# Patient Record
Sex: FEMALE | URBAN - METROPOLITAN AREA
[De-identification: names, ages, dates, MRNs, and addresses within clinical notes are randomized per-mention and may not be internally consistent; named-entity substitution may affect disease eponyms.]

---

## 2017-01-02 ENCOUNTER — OFFICE VISIT (OUTPATIENT)
Dept: OPHTHALMOLOGY | Facility: CLINIC | Age: 72
End: 2017-01-02

## 2017-01-02 DIAGNOSIS — H18.529 ABMD (ANTERIOR BASEMENT MEMBRANE DYSTROPHY): Primary | ICD-10-CM

## 2017-01-02 DIAGNOSIS — H35.369 DRUSEN (DEGENERATIVE) OF RETINA, UNSPECIFIED LATERALITY: ICD-10-CM

## 2017-01-02 DIAGNOSIS — H25.10 SENILE NUCLEAR SCLEROSIS, UNSPECIFIED LATERALITY: ICD-10-CM

## 2017-01-02 RX ORDER — ANTIOX #8/OM3/DHA/EPA/LUT/ZEAX 250-2.5 MG
1 CAPSULE ORAL 2 TIMES DAILY
COMMUNITY

## 2017-01-02 ASSESSMENT — REFRACTION_MANIFEST
OS_SPHERE: -0.75
OS_ADD: +2.50
OS_CYLINDER: +0.50
OD_ADD: +2.50
OD_CYLINDER: +1.00
OS_AXIS: 135
OD_SPHERE: -0.75
OD_AXIS: 60

## 2017-01-02 ASSESSMENT — SLIT LAMP EXAM - LIDS
COMMENTS: NORMAL
COMMENTS: NORMAL

## 2017-01-02 ASSESSMENT — CONF VISUAL FIELD
OD_NORMAL: 1
OS_NORMAL: 1

## 2017-01-02 ASSESSMENT — VISUAL ACUITY
OD_CC: 20/30
CORRECTION_TYPE: GLASSES
OS_CC: 20/30
OD_CC: J1+
OS_CC: J1+
METHOD: SNELLEN - LINEAR

## 2017-01-02 ASSESSMENT — REFRACTION_WEARINGRX
OD_SPHERE: -0.75
OD_AXIS: 57
OS_CYLINDER: +0.50
OD_ADD: +2.50
OS_SPHERE: -1.25
SPECS_TYPE: PAL
OS_ADD: +2.50
OD_CYLINDER: +1.50
OS_AXIS: 127

## 2017-01-02 ASSESSMENT — EXTERNAL EXAM - RIGHT EYE: OD_EXAM: NORMAL

## 2017-01-02 ASSESSMENT — CUP TO DISC RATIO
OD_RATIO: 0.3
OS_RATIO: 0.25

## 2017-01-02 ASSESSMENT — TONOMETRY
OD_IOP_MMHG: 20
OS_IOP_MMHG: 19
IOP_METHOD: ICARE

## 2017-01-02 ASSESSMENT — REFRACTION
OD_AXIS: 060
OD_SPHERE: -0.50
OD_CYLINDER: +0.50

## 2017-01-02 ASSESSMENT — EXTERNAL EXAM - LEFT EYE: OS_EXAM: NORMAL

## 2017-01-02 NOTE — MR AVS SNAPSHOT
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      Who to contact     Please call your clinic at [phone] to:    Ask questions about your health    Make or cancel appointments    Discuss your medicines    Learn about your test results    Speak to your doctor   If you have compliments or concerns about an experience at your clinic, or if you wish to file a complaint, please contact [facility] Patient Relations at [phone] or email us at [email or web address]         Additional Information About Your Visit        MyChart Information     [employer] gives you secure access to your electronic health record. If you see a primary care provider, you can also send messages to your care team and make appointments. If you have questions, please call your primary care clinic.  If you do not have a primary care provider, please call [phone] and they will assist you.      Mailpile is an electronic gateway that provides easy, online access to your medical records. With Mailpile, you can request a clinic appointment, read your test results, renew a prescription or communicate with your care team.     To access your existing account, please contact your [facility] or call [phone] for assistance.         Blood Pressure from Last 3 Encounters:    No data found for BP    Weight from Last 3 Encounters:   No data found for Wt              Today, you had the following     No orders found for display       Primary Care Provider Office Phone # Fax #    Navi Mei [phone]       [facility] [address] [place] [address]        Thank you!     Thank you for choosing [facility]  for your care. Our goal is always to provide you with excellent care. Hearing back from our patients is one way we can continue to improve our services. Please take a few minutes to complete the written survey that you may receive in the mail after your visit with us. Thank you!             Your Updated Medication List - Protect others around you: Learn how to safely use, store and throw away your medicines at www.disposemymeds.org.          This list is accurate as of: 1/2/17  3:15 PM.  Always use your most recent med list.                   Brand Name Dispense Instructions for use    calcium citrate-vitamin D 315-200 MG-UNIT Tabs per tablet    CITRACAL     Take 1 tablet by mouth daily       CITALOPRAM HYDROBROMIDE PO          PRESERVISION AREDS 2 Caps      Take 1 capsule by mouth 2 times daily       VITAMIN D3 PO      Take by mouth daily

## 2017-01-02 NOTE — PROGRESS NOTES
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 or sooner as needed.    I have confirmed the content of the chief complaint, history of present illness, review of systems, and past medical/surgical history sections and edited the information as needed.

## 2017-01-02 NOTE — NURSING NOTE
Chief Complaints and History of Present Illnesses   Patient presents with     Eye Problem     Blurry RE     HPI    Symptoms:     Blurred vision      Duration:  4 months         Comments:  Contrasting especially at night.  June ARGUETA 2:33 PM January 2, 2017

## 2017-04-10 ENCOUNTER — OFFICE VISIT (OUTPATIENT)
Dept: OPHTHALMOLOGY | Facility: CLINIC | Age: 72
End: 2017-04-10

## 2017-04-10 DIAGNOSIS — H18.529 ABMD (ANTERIOR BASEMENT MEMBRANE DYSTROPHY): Primary | ICD-10-CM

## 2017-04-10 ASSESSMENT — VISUAL ACUITY
CORRECTION_TYPE: GLASSES
METHOD: SNELLEN - LINEAR
OS_CC: 20/25
OD_CC+: -1
OD_CC: 20/20
OS_CC+: -1

## 2017-04-10 ASSESSMENT — EXTERNAL EXAM - RIGHT EYE: OD_EXAM: NORMAL

## 2017-04-10 ASSESSMENT — REFRACTION_WEARINGRX
OD_SPHERE: +0.25
OS_CYLINDER: +0.75
OD_ADD: +2.50
OS_AXIS: 130
OD_CYLINDER: +0.50
OS_ADD: +2.50
OS_SPHERE: -0.75
OD_AXIS: 052

## 2017-04-10 ASSESSMENT — SLIT LAMP EXAM - LIDS
COMMENTS: NORMAL
COMMENTS: NORMAL

## 2017-04-10 ASSESSMENT — TONOMETRY
IOP_METHOD: ICARE
OD_IOP_MMHG: 19
OS_IOP_MMHG: 20

## 2017-04-10 ASSESSMENT — EXTERNAL EXAM - LEFT EYE: OS_EXAM: NORMAL

## 2017-04-10 ASSESSMENT — CONF VISUAL FIELD
OD_NORMAL: 1
OS_NORMAL: 1

## 2017-04-10 NOTE — PROGRESS NOTES
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 or sooner as needed.    Complete documentation of historical and exam elements from today's encounter can  be found in the full encounter summary report (not reduplicated in this progress  note). I personally obtained the chief complaint(s) and history of present illness. I  confirmed and edited as necessary the review of systems, past medical/surgical  history, family history, social history, and examination findings as documented by  others; and I examined the patient myself. I personally reviewed the relevant tests,  images, and reports as documented above. I formulated and edited as necessary the  assessment and plan and discussed the findings and management plan with the  patient and family.    NIC Greene M.D

## 2017-04-10 NOTE — NURSING NOTE
Chief Complaints and History of Present Illnesses   Patient presents with     Follow Up For     ABMD      HPI    Last Eye Exam:  1/2/17   Affected eye(s):  Both   Symptoms:     No tearing   No Dryness   No itching   No burning   No photophobia      Duration:  4 months   Frequency:  Constant       Do you have eye pain now?:  No      Comments:  Pt returns for 4 month follow up on Diplopia. States that the only time she is noticing any Diplopia is only while driving, will see 2 green lights or 2 red lights. Using Blipify 128 qod with no problems. Denies any pain or discomfort today.   Notes that she is taking the AREDS bid po qd. SHILPA ESCOBAR, COA 9:36 AM 04/10/2017

## 2017-04-10 NOTE — MR AVS SNAPSHOT
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      Who to contact     Please call your clinic at [phone] to:    Ask questions about your health    Make or cancel appointments    Discuss your medicines    Learn about your test results    Speak to your doctor   If you have compliments or concerns about an experience at your clinic, or if you wish to file a complaint, please contact [facility] Patient Relations at [phone] or email us at [email or web address]         Additional Information About Your Visit        MyChart Information     [employer] gives you secure access to your electronic health record. If you see a primary care provider, you can also send messages to your care team and make appointments. If you have questions, please call your primary care clinic.  If you do not have a primary care provider, please call [phone] and they will assist you.      YPlan is an electronic gateway that provides easy, online access to your medical records. With YPlan, you can request a clinic appointment, read your test results, renew a prescription or communicate with your care team.     To access your existing account, please contact your [facility] or call [phone] for assistance.        Care EveryWhere ID     This is your Care EveryWhere ID. This could be used by other organizations to access your [facility]  records  [id number]         Blood Pressure from Last 3 Encounters:   No data found for BP    Weight from Last 3 Encounters:   No data found for Wt              Today, you had the following     No orders found for display       Primary Care Provider Office Phone # Fax #    Navi Mei [phone]       [facility] [address] [facility] [address]        Thank you!     Thank you for choosing [facility]  for your care. Our goal is always to provide you with excellent care. Hearing back from our patients is one way we can continue to improve our services. Please take a few minutes to complete the written survey that you may receive in the mail after your visit with us. Thank you!             Your Updated Medication List - Protect others around you: Learn how to safely use, store and throw away your medicines at www.disposemymeds.org.          This list is accurate as of: 4/10/17 10:44 AM.  Always use your most recent med list.                   Brand Name Dispense Instructions for use    calcium citrate-vitamin D 315-200 MG-UNIT Tabs per tablet    CITRACAL     Take 1 tablet by mouth daily       CITALOPRAM HYDROBROMIDE PO          PRESERVISION AREDS 2 Caps      Take 1 capsule by mouth 2 times daily       VITAMIN D3 PO      Take by mouth daily

## 2018-03-26 ENCOUNTER — OFFICE VISIT (OUTPATIENT)
Dept: OPHTHALMOLOGY | Facility: CLINIC | Age: 73
End: 2018-03-26
Payer: COMMERCIAL

## 2018-03-26 DIAGNOSIS — H35.3190 NONEXUDATIVE SENILE MACULAR DEGENERATION OF RETINA: ICD-10-CM

## 2018-03-26 DIAGNOSIS — H35.369 DRUSEN (DEGENERATIVE) OF RETINA, UNSPECIFIED LATERALITY: ICD-10-CM

## 2018-03-26 DIAGNOSIS — H25.10 SENILE NUCLEAR SCLEROSIS, UNSPECIFIED LATERALITY: ICD-10-CM

## 2018-03-26 DIAGNOSIS — H52.223 REGULAR ASTIGMATISM, BILATERAL: ICD-10-CM

## 2018-03-26 DIAGNOSIS — H18.529 ABMD (ANTERIOR BASEMENT MEMBRANE DYSTROPHY): Primary | ICD-10-CM

## 2018-03-26 ASSESSMENT — CONF VISUAL FIELD
OD_NORMAL: 1
METHOD: COUNTING FINGERS
OS_NORMAL: 1

## 2018-03-26 ASSESSMENT — TONOMETRY
IOP_METHOD: ICARE
OS_IOP_MMHG: 19
OD_IOP_MMHG: 17

## 2018-03-26 ASSESSMENT — EXTERNAL EXAM - LEFT EYE: OS_EXAM: NORMAL

## 2018-03-26 ASSESSMENT — REFRACTION_WEARINGRX
OS_AXIS: 125
OD_AXIS: 055
OD_ADD: +2.50
OD_CYLINDER: +1.50
OD_SPHERE: -1.00
SPECS_TYPE: PAL
OS_ADD: +2.50
OS_CYLINDER: +0.50
OS_SPHERE: -1.50

## 2018-03-26 ASSESSMENT — VISUAL ACUITY
OD_CC: 20/25
OS_CC: 20/25
CORRECTION_TYPE: GLASSES
METHOD: SNELLEN - LINEAR
OS_CC+: +2

## 2018-03-26 ASSESSMENT — CUP TO DISC RATIO
OD_RATIO: 0.4
OS_RATIO: 0.35

## 2018-03-26 ASSESSMENT — REFRACTION_MANIFEST
OD_AXIS: 057
OD_ADD: +2.50
OS_AXIS: 126
OS_SPHERE: -0.50
OS_CYLINDER: +0.50
OS_ADD: +2.50
OD_CYLINDER: +1.50
OD_SPHERE: -0.75

## 2018-03-26 ASSESSMENT — SLIT LAMP EXAM - LIDS
COMMENTS: NORMAL
COMMENTS: NORMAL

## 2018-03-26 ASSESSMENT — EXTERNAL EXAM - RIGHT EYE: OD_EXAM: NORMAL

## 2018-03-26 NOTE — NURSING NOTE
Chief Complaints and History of Present Illnesses   Patient presents with     Annual Eye Exam     HPI    Affected eye(s):  Both   Symptoms:     No difficulty with reading   Double vision (Comment: bothers more with driving and especially night driving)   No floaters   No flashes   No Dryness   No burning       Other:  'several years' per pt   Frequency:  Intermittent       Do you have eye pain now?:  No      Comments:  Annual exam / thinks may get new glasses from this visit.    Maci ERVIN 8:35 AM 03/26/2018

## 2018-03-26 NOTE — PROGRESS NOTES
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 or sooner as needed.    Complete documentation of historical and exam elements from today's encounter can  be found in the full encounter summary report (not reduplicated in this progress  note). I personally obtained the chief complaint(s) and history of present illness. I  confirmed and edited as necessary the review of systems, past medical/surgical  history, family history, social history, and examination findings as documented by  others; and I examined the patient myself. I personally reviewed the relevant tests,  images, and reports as documented above. I formulated and edited as necessary the  assessment and plan and discussed the findings and management plan with the  patient and family.    NIC Greene M.D

## 2018-03-26 NOTE — MR AVS SNAPSHOT
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      Who to contact     Please call your clinic at [phone] to:    Ask questions about your health    Make or cancel appointments    Discuss your medicines    Learn about your test results    Speak to your doctor            Additional Information About Your Visit        TextualAdsharWhere Was it Filmed Information     M[employer]Match gives you secure access to your electronic health record. If you see a primary care provider, you can also send messages to your care team and make appointments. If you have questions, please call your primary care clinic.  If you do not have a primary care provider, please call [phone] and they will assist you.      MedyMatch is an electronic gateway that provides easy, online access to your medical records. With MedyMatch, you can request a clinic appointment, read your test results, renew a prescription or communicate with your care team.     To access your existing account, please contact your [facility] or call [phone] for assistance.        Care EveryWhere ID     This is your Care EveryWhere ID. This could be used by other organizations to access your [facility]  records  [id number]         Blood Pressure from Last 3 Encounters:   No data found for BP    Weight from Last 3 Encounters:   No data found for Wt              Today, you had the following     No orders found for display       Primary Care Provider Office Phone # Fax #    Navi Mei [phone]       [facility] [address] [facility] [address]        Equal Access to Services     HIRAL DE LA PAZ : Hadii aad ku hadasho Soomaali, waaxda luqadaha, qaybta kaalmada adeegyada, waxay idiin hayaan adeeg kharash la'vinodn ah. So [place] [phone].    ATENCIÓN: Si leonie haywood, tiene a cox disposición servicios gratuitos de asistencia lingüística. Llame al [phone].    We comply with applicable federal civil rights laws and [place] laws. We do not discriminate on the basis of race, color, national origin, age, disability, sex, sexual orientation, or gender identity.            Thank you!     Thank you for choosing [facility]  for your care. Our goal is always to provide you with excellent care. Hearing back from our patients is one way we can continue to improve our services. Please take a few minutes to complete the written survey that you may receive in the mail after your visit with us. Thank you!             Your Updated Medication List - Protect others around you: Learn how to safely use, store and throw away your medicines at www.disposemymeds.org.          This list is accurate as of 3/26/18  5:04 PM.  Always use your most recent med list.                   Brand Name Dispense Instructions for use Diagnosis    calcium citrate-vitamin D 315-200 MG-UNIT Tabs per tablet    CITRACAL     Take 1 tablet by mouth daily        CITALOPRAM HYDROBROMIDE PO           PRESERVISION AREDS 2 Caps      Take 1 capsule by mouth 2 times daily        VITAMIN D3 PO      Take by mouth daily

## 2019-10-03 ENCOUNTER — HEALTH MAINTENANCE LETTER (OUTPATIENT)
Age: 74
End: 2019-10-03

## 2020-02-08 ENCOUNTER — HEALTH MAINTENANCE LETTER (OUTPATIENT)
Age: 75
End: 2020-02-08

## 2020-11-07 ENCOUNTER — HEALTH MAINTENANCE LETTER (OUTPATIENT)
Age: 75
End: 2020-11-07

## 2021-03-27 ENCOUNTER — HEALTH MAINTENANCE LETTER (OUTPATIENT)
Age: 76
End: 2021-03-27

## 2021-09-05 ENCOUNTER — HEALTH MAINTENANCE LETTER (OUTPATIENT)
Age: 76
End: 2021-09-05

## 2021-10-31 ENCOUNTER — HEALTH MAINTENANCE LETTER (OUTPATIENT)
Age: 76
End: 2021-10-31

## 2022-04-17 ENCOUNTER — HEALTH MAINTENANCE LETTER (OUTPATIENT)
Age: 77
End: 2022-04-17

## 2022-10-29 ENCOUNTER — HEALTH MAINTENANCE LETTER (OUTPATIENT)
Age: 77
End: 2022-10-29

## 2023-06-01 ENCOUNTER — HEALTH MAINTENANCE LETTER (OUTPATIENT)
Age: 78
End: 2023-06-01